# Patient Record
Sex: MALE | Race: AMERICAN INDIAN OR ALASKA NATIVE
[De-identification: names, ages, dates, MRNs, and addresses within clinical notes are randomized per-mention and may not be internally consistent; named-entity substitution may affect disease eponyms.]

---

## 2019-11-07 ENCOUNTER — HOSPITAL ENCOUNTER (INPATIENT)
Dept: HOSPITAL 5 - 4A | Age: 64
LOS: 4 days | Discharge: HOME HEALTH | DRG: 640 | End: 2019-11-11
Attending: INTERNAL MEDICINE | Admitting: INTERNAL MEDICINE
Payer: MEDICARE

## 2019-11-07 DIAGNOSIS — D63.1: ICD-10-CM

## 2019-11-07 DIAGNOSIS — Z80.1: ICD-10-CM

## 2019-11-07 DIAGNOSIS — Z82.49: ICD-10-CM

## 2019-11-07 DIAGNOSIS — C34.90: ICD-10-CM

## 2019-11-07 DIAGNOSIS — N17.0: ICD-10-CM

## 2019-11-07 DIAGNOSIS — E44.0: ICD-10-CM

## 2019-11-07 DIAGNOSIS — E87.1: Primary | ICD-10-CM

## 2019-11-07 DIAGNOSIS — N18.9: ICD-10-CM

## 2019-11-07 DIAGNOSIS — D63.8: ICD-10-CM

## 2019-11-07 DIAGNOSIS — N10: ICD-10-CM

## 2019-11-07 DIAGNOSIS — J44.9: ICD-10-CM

## 2019-11-07 LAB
ALBUMIN SERPL-MCNC: 3.1 G/DL (ref 3.9–5)
ALT SERPL-CCNC: 6 UNITS/L (ref 7–56)
BUN SERPL-MCNC: 54 MG/DL (ref 9–20)
BUN/CREAT SERPL: 12 %
CALCIUM SERPL-MCNC: 8.6 MG/DL (ref 8.4–10.2)
HCT VFR BLD CALC: 25.9 % (ref 35.5–45.6)
HEMOLYSIS INDEX: 0
HGB BLD-MCNC: 8.8 GM/DL (ref 11.8–15.2)
MCHC RBC AUTO-ENTMCNC: 34 % (ref 32–34)
MCV RBC AUTO: 96 FL (ref 84–94)
PLATELET # BLD: 195 K/MM3 (ref 140–440)
RBC # BLD AUTO: 2.69 M/MM3 (ref 3.65–5.03)

## 2019-11-07 PROCEDURE — 85007 BL SMEAR W/DIFF WBC COUNT: CPT

## 2019-11-07 PROCEDURE — 36415 COLL VENOUS BLD VENIPUNCTURE: CPT

## 2019-11-07 PROCEDURE — 90686 IIV4 VACC NO PRSV 0.5 ML IM: CPT

## 2019-11-07 PROCEDURE — 96374 THER/PROPH/DIAG INJ IV PUSH: CPT

## 2019-11-07 PROCEDURE — 89050 BODY FLUID CELL COUNT: CPT

## 2019-11-07 PROCEDURE — 85025 COMPLETE CBC W/AUTO DIFF WBC: CPT

## 2019-11-07 PROCEDURE — 87116 MYCOBACTERIA CULTURE: CPT

## 2019-11-07 PROCEDURE — 81001 URINALYSIS AUTO W/SCOPE: CPT

## 2019-11-07 PROCEDURE — 94760 N-INVAS EAR/PLS OXIMETRY 1: CPT

## 2019-11-07 PROCEDURE — 71045 X-RAY EXAM CHEST 1 VIEW: CPT

## 2019-11-07 PROCEDURE — 94640 AIRWAY INHALATION TREATMENT: CPT

## 2019-11-07 PROCEDURE — 84300 ASSAY OF URINE SODIUM: CPT

## 2019-11-07 PROCEDURE — 96375 TX/PRO/DX INJ NEW DRUG ADDON: CPT

## 2019-11-07 PROCEDURE — 85730 THROMBOPLASTIN TIME PARTIAL: CPT

## 2019-11-07 PROCEDURE — 82570 ASSAY OF URINE CREATININE: CPT

## 2019-11-07 PROCEDURE — 84156 ASSAY OF PROTEIN URINE: CPT

## 2019-11-07 PROCEDURE — 85027 COMPLETE CBC AUTOMATED: CPT

## 2019-11-07 PROCEDURE — 80053 COMPREHEN METABOLIC PANEL: CPT

## 2019-11-07 PROCEDURE — 85610 PROTHROMBIN TIME: CPT

## 2019-11-07 PROCEDURE — 80048 BASIC METABOLIC PNL TOTAL CA: CPT

## 2019-11-07 PROCEDURE — 77012 CT SCAN FOR NEEDLE BIOPSY: CPT

## 2019-11-07 PROCEDURE — 82962 GLUCOSE BLOOD TEST: CPT

## 2019-11-07 RX ADMIN — SODIUM CHLORIDE SCH MLS/HR: 0.9 INJECTION, SOLUTION INTRAVENOUS at 23:17

## 2019-11-07 RX ADMIN — Medication SCH ML: at 23:17

## 2019-11-07 RX ADMIN — OXYCODONE AND ACETAMINOPHEN PRN TAB: 5; 325 TABLET ORAL at 23:16

## 2019-11-07 RX ADMIN — FAMOTIDINE SCH MG: 20 TABLET ORAL at 23:15

## 2019-11-08 LAB
ALBUMIN SERPL-MCNC: 3 G/DL (ref 3.9–5)
ALT SERPL-CCNC: < 5 UNITS/L (ref 7–56)
ANISOCYTOSIS BLD QL SMEAR: (no result)
ANISOCYTOSIS BLD QL SMEAR: (no result)
APTT BLD: 33.2 SEC. (ref 24.2–36.6)
BAND NEUTROPHILS # (MANUAL): 0 K/MM3
BAND NEUTROPHILS # (MANUAL): 0 K/MM3
BILIRUB UR QL STRIP: (no result)
BLOOD UR QL VISUAL: (no result)
BUN SERPL-MCNC: 52 MG/DL (ref 9–20)
BUN/CREAT SERPL: 12 %
CALCIUM SERPL-MCNC: 8.5 MG/DL (ref 8.4–10.2)
HCT VFR BLD CALC: 24.4 % (ref 35.5–45.6)
HEMOLYSIS INDEX: 0
HGB BLD-MCNC: 8.2 GM/DL (ref 11.8–15.2)
INR PPP: 1.21 (ref 0.87–1.13)
MCHC RBC AUTO-ENTMCNC: 34 % (ref 32–34)
MCV RBC AUTO: 95 FL (ref 84–94)
MYELOCYTES # (MANUAL): 0 K/MM3
MYELOCYTES # (MANUAL): 0 K/MM3
PH UR STRIP: 6 [PH] (ref 5–7)
PLATELET # BLD: 192 K/MM3 (ref 140–440)
PROMYELOCYTES # (MANUAL): 0 K/MM3
PROMYELOCYTES # (MANUAL): 0 K/MM3
PROT UR STRIP-MCNC: (no result) MG/DL
RBC # BLD AUTO: 2.58 M/MM3 (ref 3.65–5.03)
RBC #/AREA URNS HPF: 2 /HPF (ref 0–6)
TOTAL CELLS COUNTED BLD: 100
TOTAL CELLS COUNTED BLD: 100
UROBILINOGEN UR-MCNC: < 2 MG/DL (ref ?–2)
WBC #/AREA URNS HPF: < 1 /HPF (ref 0–6)

## 2019-11-08 PROCEDURE — 0TB03ZX EXCISION OF RIGHT KIDNEY, PERCUTANEOUS APPROACH, DIAGNOSTIC: ICD-10-PCS

## 2019-11-08 RX ADMIN — Medication SCH ML: at 11:58

## 2019-11-08 RX ADMIN — HYDROMORPHONE HYDROCHLORIDE PRN MG: 1 INJECTION, SOLUTION INTRAMUSCULAR; INTRAVENOUS; SUBCUTANEOUS at 20:33

## 2019-11-08 RX ADMIN — Medication SCH ML: at 23:29

## 2019-11-08 RX ADMIN — SODIUM CHLORIDE SCH MLS/HR: 0.9 INJECTION, SOLUTION INTRAVENOUS at 15:29

## 2019-11-08 RX ADMIN — SODIUM CHLORIDE SCH MLS/HR: 0.9 INJECTION, SOLUTION INTRAVENOUS at 23:20

## 2019-11-08 RX ADMIN — METHYLPREDNISOLONE SODIUM SUCCINATE SCH MG: 125 INJECTION, POWDER, FOR SOLUTION INTRAMUSCULAR; INTRAVENOUS at 11:50

## 2019-11-08 RX ADMIN — OXYCODONE AND ACETAMINOPHEN PRN TAB: 5; 325 TABLET ORAL at 23:25

## 2019-11-08 RX ADMIN — HYDROMORPHONE HYDROCHLORIDE PRN MG: 1 INJECTION, SOLUTION INTRAMUSCULAR; INTRAVENOUS; SUBCUTANEOUS at 08:24

## 2019-11-08 RX ADMIN — ONDANSETRON PRN MG: 2 INJECTION INTRAMUSCULAR; INTRAVENOUS at 20:33

## 2019-11-08 RX ADMIN — IPRATROPIUM BROMIDE AND ALBUTEROL SULFATE SCH AMPUL: .5; 3 SOLUTION RESPIRATORY (INHALATION) at 13:40

## 2019-11-08 RX ADMIN — FAMOTIDINE SCH MG: 20 TABLET ORAL at 11:50

## 2019-11-08 RX ADMIN — IPRATROPIUM BROMIDE AND ALBUTEROL SULFATE SCH AMPUL: .5; 3 SOLUTION RESPIRATORY (INHALATION) at 21:29

## 2019-11-08 RX ADMIN — FAMOTIDINE SCH MG: 20 TABLET ORAL at 23:20

## 2019-11-08 RX ADMIN — SENNOSIDES AND DOCUSATE SODIUM PRN TAB: 50; 8.6 TABLET ORAL at 23:21

## 2019-11-08 NOTE — CAT SCAN REPORT
CT-GUIDED RENAL BIOPSY, RIGHT



INDICATION : acute renal failure.



COMPARISON:  None



PROCEDURE:  The risks (including but not limited to bleeding and infection) and benefits were explain
ed to the patient and informed consent was obtained.  All CT scans at this location are performed usi
ng CT dose reduction for ALARA by means of automated exposure control. 



A time out procedure was performed.  The procedure site was prepped and draped in the usual sterile f
ashion and lidocaine was used for local anesthesia.  



Anxiolysis was accomplished with IV Dilaudid and Zofran. Using CT guidance, a 17-gauge introducer nee
dle was advanced to the inferior pole of the right kidney. 2 separate 18-gauge 1.3 cm core biopsies w
ere obtained for pathologic analysis. Follow-up scan demonstrates no evidence for uncontained hemorrh
age.



The patient tolerated the procedure well with no complications.



IMPRESSION: Successful CT-guided biopsy at the inferior pole of the right kidney.



Signer Name: Donny Jaffe Jr, MD 

Signed: 11/8/2019 11:43 AM

 Workstation Name: [id number]

## 2019-11-08 NOTE — HISTORY AND PHYSICAL REPORT
CHIEF COMPLAINT:

1.  Elevated creatinine level.

2.  Direct admission from Dr. Reis's office.



HISTORY OF PRESENT ILLNESS:  A 64-year-old male with history of lung cancer and

elevated creatinine, recently admitted to [facility] from

10/14/2019 to 10/25/2019 for acute kidney injury and worsening pulmonary

infiltration from his lung cancer, on chemotherapy, comes in for worsening

creatinine level.  The patient's creatinine was 1.5 in 2019.  Recent

creatinine at [facility] was high with BUN of 72 and creatinine of 4.7 and

GFR of 14 mL per minute.  The patient follows at [facility] for his lung cancer

and chemotherapy.  No history of diabetes, hypertension.  The patient sent from

Dr. Reis's office for worsening creatinine level for acute kidney injury on

chronic kidney disease.



PAST MEDICAL HISTORY:  As mentioned, lung cancer, chronic kidney disease and

COPD secondary to smoking.  Arthritis.



PAST SURGICAL HISTORY:  Denies.



FAMILY HISTORY:  Father  secondary to lung cancer.  Mother is alive.



SOCIAL HISTORY:  Smoker.  Stopped smoking recently.  No alcohol, no recreational

drugs.



FAMILY HISTORY:  Hypertension and lung cancer.



REVIEW OF SYSTEMS:  Significant for generalized weakness.  Able to pass urine. 

No shortness of breath.  Otherwise, review of systems negative.  No fever or

chills.  A 14-point review of systems done.



PHYSICAL EXAMINATION:

GENERAL:  A young elderly male, cooperative during examination.

VITAL SIGNS:  Blood pressure 112/79, temperature 98.8, respiratory rate is 20,

sats are 100%.

HEENT:  Unremarkable.  Pupils equal and reactive.

NECK:  Supple, no lymphadenopathy, no thyromegaly.

LUNGS:  Clear to auscultation and percussion.  Good air entry.

CARDIOVASCULAR:  S1, S2 heard.  No gallop, no murmur, no rub.  Apical impulse in

left fifth intercostal space and midclavicular line.

ABDOMEN:  Soft and benign.  No hepatosplenomegaly.  No guarding, no rigidity. 

Hernial orifices are normal.

EXTREMITIES:  Good pedal pulses.  No pedal edema.

CENTRAL NERVOUS SYSTEM:  Alert and oriented x 4, nonfocal exam.



LABORATORY DATA:  Significant for white count of 7500, H and H of 8.8 and 25.9,

platelet count of 195,000.  Sodium is 129, BUN and creatinine is 54 and 4.5,

potassium is 4.6, albumin is 3.1.



ASSESSMENT AND PLAN:

1.  Acute kidney injury, IV fluids for now.  The patient to have renal biopsy

tomorrow.  Nephrology consult requested.

2.Lung cancer.  The patient following with [facility].

3.Chronic kidney disease as per Dr. Reis.

4.COPD--Cont Duonebs qid prn

5.Deep venous thrombosis prophylaxis, heparin 5000 q. 12.



In summary, patient has MIGUELANGEL on chronic kidney disease and lung cancer for which

a follow up visit at [facility].  The patient for renal biopsy in the morning.





DD: 2019 

DT: 2019 

JOB# [id number]  [id number]

[facility]/PRUDENCE MORALES

## 2019-11-08 NOTE — PROGRESS NOTE
Assessment and Plan


Assessment and plan: 


--Acute kidney injury; probably secondary to vasomotor nephropathy


Gentle hydration, monitor renal function, avoid nephrotoxins


Nephrology following, recommended biopsy





--Hyponatremia; gentle hydration, monitor electrolytes





--Anemia; probably anemia due to ?  Chronic kidney disease


Closely monitor H&H and transfuse as needed





--History of lung cancer; follows with hematology oncologist





-History of COPD; oxygen titrate to O2 sats more than 90%


Nebulizers IV steroids if needed





--Moderate malnutrition/hypoalbuminemia


Supportive care nutrition consult if needed





--DVT prophylaxis; heparin renal dose





Monitor closely and adjust management as needed





History


Interval history: 


Sincerely and examined medical records reviewed and 


patient feels better no new complaints


Vital signs noted











Hospitalist Physical





- Constitutional


Vitals: 


                                        











Temp Pulse Resp BP Pulse Ox


 


 97.9 F   84   18   112/73   98 


 


 11/08/19 07:35  11/08/19 07:35  11/08/19 07:35  11/08/19 07:35  11/08/19 08:11











General appearance: Present: no acute distress, well-nourished





- EENT


Eyes: Present: PERRL, EOM intact





- Neck


Neck: Present: supple, normal ROM





- Respiratory


Respiratory effort: normal


Respiratory: bilateral: diminished, negative: rales, rhonchi, wheezing





- Cardiovascular


Rhythm: regular


Heart Sounds: Present: S1 & S2





- Extremities


Extremities: no ischemia, No edema





- Abdominal


General gastrointestinal: soft, non-tender, non-distended, normal bowel sounds





- Integumentary


Integumentary: Present: clear, warm





- Psychiatric


Psychiatric: appropriate mood/affect, cooperative





- Neurologic


Neurologic: moves all extremities





Results





- Labs


CBC & Chem 7: 


                                 11/09/19 05:51





                                 11/09/19 05:51


Labs: 


                             Laboratory Last Values











WBC  6.6 K/mm3 (4.5-11.0)   11/08/19  05:41    


 


RBC  2.58 M/mm3 (3.65-5.03)  L  11/08/19  05:41    


 


Hgb  8.2 gm/dl (11.8-15.2)  L  11/08/19  05:41    


 


Hct  24.4 % (35.5-45.6)  L  11/08/19  05:41    


 


MCV  95 fl (84-94)  H  11/08/19  05:41    


 


MCH  32 pg (28-32)   11/08/19  05:41    


 


MCHC  34 % (32-34)   11/08/19  05:41    


 


RDW  24.7 % (13.2-15.2)  H  11/08/19  05:41    


 


Plt Count  192 K/mm3 (140-440)   11/08/19  05:41    


 


Add Manual Diff  Complete   11/08/19  05:41    


 


Total Counted  100   11/08/19  05:41    


 


Seg Neuts % (Manual)  79.0 % (40.0-70.0)  H  11/08/19  05:41    


 


Band Neutrophils %  0 %  11/08/19  05:41    


 


Lymphocytes % (Manual)  12.0 % (13.4-35.0)  L  11/08/19  05:41    


 


Reactive Lymphs % (Man)  0 %  11/08/19  05:41    


 


Monocytes % (Manual)  8.0 % (0.0-7.3)  H  11/08/19  05:41    


 


Eosinophils % (Manual)  1.0 % (0.0-4.3)   11/08/19  05:41    


 


Basophils % (Manual)  0 % (0.0-1.8)   11/08/19  05:41    


 


Metamyelocytes %  0 %  11/08/19  05:41    


 


Myelocytes %  0 %  11/08/19  05:41    


 


Promyelocytes %  0 %  11/08/19  05:41    


 


Blast Cells %  0 %  11/08/19  05:41    


 


Nucleated RBC %  Not Reportable   11/08/19  05:41    


 


Seg Neutrophils # Man  5.2 K/mm3 (1.8-7.7)   11/08/19  05:41    


 


Band Neutrophils #  0.0 K/mm3  11/08/19  05:41    


 


Lymphocytes # (Manual)  0.8 K/mm3 (1.2-5.4)  L  11/08/19  05:41    


 


Abs React Lymphs (Man)  0.0 K/mm3  11/08/19  05:41    


 


Monocytes # (Manual)  0.5 K/mm3 (0.0-0.8)   11/08/19  05:41    


 


Eosinophils # (Manual)  0.1 K/mm3 (0.0-0.4)   11/08/19  05:41    


 


Basophils # (Manual)  0.0 K/mm3 (0.0-0.1)   11/08/19  05:41    


 


Metamyelocytes #  0.0 K/mm3  11/08/19  05:41    


 


Myelocytes #  0.0 K/mm3  11/08/19  05:41    


 


Promyelocytes #  0.0 K/mm3  11/08/19  05:41    


 


Blast Cells #  0.0 K/mm3  11/08/19  05:41    


 


WBC Morphology  Not Reportable   11/08/19  05:41    


 


Hypersegmented Neuts  Not Reportable   11/08/19  05:41    


 


Hyposegmented Neuts  Not Reportable   11/08/19  05:41    


 


Hypogranular Neuts  Not Reportable   11/08/19  05:41    


 


Smudge Cells  Not Reportable   11/08/19  05:41    


 


Toxic Granulation  Not Reportable   11/08/19  05:41    


 


Toxic Vacuolation  Not Reportable   11/08/19  05:41    


 


Dohle Bodies  Not Reportable   11/08/19  05:41    


 


Pelger-Huet Anomaly  Not Reportable   11/08/19  05:41    


 


Zack Rods  Not Reportable   11/08/19  05:41    


 


Platelet Estimate  Consistent w auto   11/08/19  05:41    


 


Clumped Platelets  Not Reportable   11/08/19  05:41    


 


Plt Clumps, EDTA  Not Reportable   11/08/19  05:41    


 


Large Platelets  Not Reportable   11/08/19  05:41    


 


Giant Platelets  Not Reportable   11/08/19  05:41    


 


Platelet Satelliting  Not Reportable   11/08/19  05:41    


 


Plt Morphology Comment  Not Reportable   11/08/19  05:41    


 


RBC Morphology  Not Reportable   11/08/19  05:41    


 


Dimorphic RBCs  Not Reportable   11/08/19  05:41    


 


Polychromasia  Not Reportable   11/08/19  05:41    


 


Hypochromasia  Not Reportable   11/08/19  05:41    


 


Poikilocytosis  Few   11/08/19  05:41    


 


Anisocytosis  1+   11/08/19  05:41    


 


Microcytosis  Few   11/08/19  05:41    


 


Macrocytosis  Not Reportable   11/08/19  05:41    


 


Spherocytes  Not Reportable   11/08/19  05:41    


 


Pappenheimer Bodies  Not Reportable   11/08/19  05:41    


 


Sickle Cells  Not Reportable   11/08/19  05:41    


 


Target Cells  Not Reportable   11/08/19  05:41    


 


Tear Drop Cells  Rare   11/08/19  05:41    


 


Ovalocytes  Few   11/08/19  05:41    


 


Helmet Cells  Not Reportable   11/08/19  05:41    


 


Wilson-Delia Bodies  Not Reportable   11/08/19  05:41    


 


Cabot Rings  Not Reportable   11/08/19  05:41    


 


[place] Cells  Not Reportable   11/08/19  05:41    


 


Bite Cells  Not Reportable   11/08/19  05:41    


 


Crenated Cell  Not Reportable   11/08/19  05:41    


 


Elliptocytes  Few   11/08/19  05:41    


 


Acanthocytes (Spur)  Not Reportable   11/08/19  05:41    


 


Rouleaux  Not Reportable   11/08/19  05:41    


 


Hemoglobin C Crystals  Not Reportable   11/08/19  05:41    


 


Schistocytes  Not Reportable   11/08/19  05:41    


 


Malaria parasites  Not Reportable   11/08/19  05:41    


 


Sharad Bodies  Not Reportable   11/08/19  05:41    


 


Hem Pathologist Commnt  No   11/08/19  05:41    


 


PT  15.2 Sec. (12.2-14.9)  H  11/08/19  05:41    


 


INR  1.21  (0.87-1.13)  H  11/08/19  05:41    


 


APTT  33.2 Sec. (24.2-36.6)   11/08/19  05:41    


 


Sodium  131 mmol/L (137-145)  L  11/08/19  05:41    


 


Potassium  4.1 mmol/L (3.6-5.0)   11/08/19  05:41    


 


Chloride  98.1 mmol/L ()   11/08/19  05:41    


 


Carbon Dioxide  18 mmol/L (22-30)  L  11/08/19  05:41    


 


Anion Gap  19 mmol/L  11/08/19  05:41    


 


BUN  52 mg/dL (9-20)  H  11/08/19  05:41    


 


Creatinine  4.2 mg/dL (0.8-1.5)  H  11/08/19  05:41    


 


Estimated GFR  17 ml/min  11/08/19  05:41    


 


BUN/Creatinine Ratio  12 %  11/08/19  05:41    


 


Glucose  91 mg/dL ()   11/08/19  05:41    


 


Calcium  8.5 mg/dL (8.4-10.2)   11/08/19  05:41    


 


Total Bilirubin  0.40 mg/dL (0.1-1.2)   11/08/19  05:41    


 


AST  19 units/L (5-40)   11/08/19  05:41    


 


ALT  < 5 units/L (7-56)  L  11/08/19  05:41    


 


Alkaline Phosphatase  68 units/L ()   11/08/19  05:41    


 


Total Protein  6.7 g/dL (6.3-8.2)   11/08/19  05:41    


 


Albumin  3.0 g/dL (3.9-5)  L  11/08/19  05:41    


 


Albumin/Globulin Ratio  0.8 %  11/08/19  05:41    














Active Medications





- Current Medications


Current Medications: 














Generic Name Dose Route Start Last Admin





  Trade Name Freq  PRN Reason Stop Dose Admin


 


Acetaminophen  650 mg  11/07/19 22:20 





  Tylenol  PO  





  Q4H PRN  





  Pain MILD(1-3)/Fever >100.5/HA  


 


Famotidine  20 mg  11/07/19 23:00  11/07/19 23:15





  Pepcid  PO   20 mg





  BID FERNANDA   Administration


 


Hydromorphone HCl  0.5 mg  11/07/19 22:20  11/08/19 08:24





  Dilaudid  IV   0.5 mg





  Q3H PRN   Administration





  Pain , Severe (7-10)  


 


Sodium Chloride  1,000 mls @ 100 mls/hr  11/07/19 19:00  11/07/19 23:17





  Nacl 0.9% 1000 Ml  IV   100 mls/hr





  AS DIRECT FERNANDA   Administration


 


Ondansetron HCl  4 mg  11/07/19 22:20 





  Zofran  IV  





  Q8H PRN  





  Nausea And Vomiting  


 


Oxycodone/Acetaminophen  1 tab  11/07/19 22:20  11/07/19 23:16





  Percocet 5/325  PO   1 tab





  Q6H PRN   Administration





  Pain, Moderate (4-6)  


 


Sodium Chloride  10 ml  11/07/19 23:00  11/07/19 23:17





  Sodium Chloride Flush Syringe 10 Ml  IV   10 ml





  BID FERNANDA   Administration


 


Sodium Chloride  10 ml  11/07/19 22:20 





  Sodium Chloride Flush Syringe 10 Ml  IV  





  PRN PRN  





  LINE FLUSH

## 2019-11-09 LAB
BUN SERPL-MCNC: 53 MG/DL (ref 9–20)
BUN/CREAT SERPL: 12 %
CALCIUM SERPL-MCNC: 8.8 MG/DL (ref 8.4–10.2)
HCT VFR BLD CALC: 22.2 % (ref 35.5–45.6)
HEMOLYSIS INDEX: 2
HGB BLD-MCNC: 7.4 GM/DL (ref 11.8–15.2)
MCHC RBC AUTO-ENTMCNC: 34 % (ref 32–34)
MCV RBC AUTO: 96 FL (ref 84–94)
PLATELET # BLD: 222 K/MM3 (ref 140–440)
RBC # BLD AUTO: 2.3 M/MM3 (ref 3.65–5.03)

## 2019-11-09 RX ADMIN — IPRATROPIUM BROMIDE AND ALBUTEROL SULFATE SCH AMPUL: .5; 3 SOLUTION RESPIRATORY (INHALATION) at 22:05

## 2019-11-09 RX ADMIN — Medication SCH ML: at 21:38

## 2019-11-09 RX ADMIN — SODIUM CHLORIDE SCH MLS/HR: 0.9 INJECTION, SOLUTION INTRAVENOUS at 19:45

## 2019-11-09 RX ADMIN — Medication SCH ML: at 10:33

## 2019-11-09 RX ADMIN — ONDANSETRON PRN MG: 2 INJECTION INTRAMUSCULAR; INTRAVENOUS at 19:45

## 2019-11-09 RX ADMIN — METHYLPREDNISOLONE SODIUM SUCCINATE SCH MG: 125 INJECTION, POWDER, FOR SOLUTION INTRAMUSCULAR; INTRAVENOUS at 10:30

## 2019-11-09 RX ADMIN — FAMOTIDINE SCH MG: 20 TABLET ORAL at 10:30

## 2019-11-09 RX ADMIN — IPRATROPIUM BROMIDE AND ALBUTEROL SULFATE SCH AMPUL: .5; 3 SOLUTION RESPIRATORY (INHALATION) at 07:46

## 2019-11-09 RX ADMIN — HYDROMORPHONE HYDROCHLORIDE PRN MG: 1 INJECTION, SOLUTION INTRAMUSCULAR; INTRAVENOUS; SUBCUTANEOUS at 10:36

## 2019-11-09 RX ADMIN — SENNOSIDES AND DOCUSATE SODIUM PRN TAB: 50; 8.6 TABLET ORAL at 20:35

## 2019-11-09 RX ADMIN — IPRATROPIUM BROMIDE AND ALBUTEROL SULFATE SCH AMPUL: .5; 3 SOLUTION RESPIRATORY (INHALATION) at 14:07

## 2019-11-09 RX ADMIN — FAMOTIDINE SCH MG: 20 TABLET ORAL at 21:37

## 2019-11-09 RX ADMIN — HYDROMORPHONE HYDROCHLORIDE PRN MG: 1 INJECTION, SOLUTION INTRAMUSCULAR; INTRAVENOUS; SUBCUTANEOUS at 20:37

## 2019-11-09 NOTE — PROGRESS NOTE
Assessment and Plan


Assessment and plan: 


--Acute kidney injury; probably secondary to vasomotor nephropathy


Gentle hydration, monitor renal function, avoid nephrotoxins


Nephrology following, recommended biopsy





--Hyponatremia; improving gradually


 gentle hydration with sodium chloride, monitor electrolytes





--Anemia; probably anemia due to ?  Chronic kidney disease


Closely monitor H&H and transfuse as needed





--History of lung cancer; follows with hematology oncologist





-History of COPD; oxygen titrate to O2 sats more than 90%


Nebulizers IV steroids if needed





--Moderate malnutrition/hypoalbuminemia


Supportive care nutrition consult if needed





--DVT prophylaxis; heparin renal dose





Monitor closely and adjust management as needed








History


Interval history: 


Patient seen and examined medical records reviewed


Patient is very concerned about his kidneys


Complains of generalized weakness


Vital signs noted








Hospitalist Physical





- Constitutional


Vitals: 


                                        











Temp Pulse Resp BP Pulse Ox


 


 98.0 F   89   18   117/73   98 


 


 11/09/19 04:38  11/09/19 04:38  11/09/19 04:38  11/09/19 04:38  11/09/19 04:38











General appearance: Present: no acute distress, well-nourished





- EENT


Eyes: Present: PERRL, EOM intact





- Neck


Neck: Present: supple, normal ROM





- Respiratory


Respiratory effort: normal


Respiratory: bilateral: diminished, negative: rales, rhonchi, wheezing





- Cardiovascular


Rhythm: regular


Heart Sounds: Present: S1 & S2





- Extremities


Extremities: no ischemia, No edema





- Abdominal


General gastrointestinal: soft, non-tender, non-distended, normal bowel sounds





- Integumentary


Integumentary: Present: clear, warm





- Psychiatric


Psychiatric: appropriate mood/affect, cooperative





- Neurologic


Neurologic: moves all extremities





Results





- Labs


CBC & Chem 7: 


                                 11/09/19 05:51





                                 11/09/19 05:51


Labs: 


                             Laboratory Last Values











WBC  9.8 K/mm3 (4.5-11.0)   11/09/19  05:51    


 


RBC  2.30 M/mm3 (3.65-5.03)  L  11/09/19  05:51    


 


Hgb  7.4 gm/dl (11.8-15.2)  L  11/09/19  05:51    


 


Hct  22.2 % (35.5-45.6)  L  11/09/19  05:51    


 


MCV  96 fl (84-94)  H  11/09/19  05:51    


 


MCH  32 pg (28-32)   11/09/19  05:51    


 


MCHC  34 % (32-34)   11/09/19  05:51    


 


RDW  24.0 % (13.2-15.2)  H  11/09/19  05:51    


 


Plt Count  222 K/mm3 (140-440)   11/09/19  05:51    


 


Add Manual Diff  Complete   11/08/19  05:41    


 


Total Counted  100   11/08/19  05:41    


 


Seg Neuts % (Manual)  79.0 % (40.0-70.0)  H  11/08/19  05:41    


 


Band Neutrophils %  0 %  11/08/19  05:41    


 


Lymphocytes % (Manual)  12.0 % (13.4-35.0)  L  11/08/19  05:41    


 


Reactive Lymphs % (Man)  0 %  11/08/19  05:41    


 


Monocytes % (Manual)  8.0 % (0.0-7.3)  H  11/08/19  05:41    


 


Eosinophils % (Manual)  1.0 % (0.0-4.3)   11/08/19  05:41    


 


Basophils % (Manual)  0 % (0.0-1.8)   11/08/19  05:41    


 


Metamyelocytes %  0 %  11/08/19  05:41    


 


Myelocytes %  0 %  11/08/19  05:41    


 


Promyelocytes %  0 %  11/08/19  05:41    


 


Blast Cells %  0 %  11/08/19  05:41    


 


Nucleated RBC %  Not Reportable   11/08/19  05:41    


 


Seg Neutrophils # Man  5.2 K/mm3 (1.8-7.7)   11/08/19  05:41    


 


Band Neutrophils #  0.0 K/mm3  11/08/19  05:41    


 


Lymphocytes # (Manual)  0.8 K/mm3 (1.2-5.4)  L  11/08/19  05:41    


 


Abs React Lymphs (Man)  0.0 K/mm3  11/08/19  05:41    


 


Monocytes # (Manual)  0.5 K/mm3 (0.0-0.8)   11/08/19  05:41    


 


Eosinophils # (Manual)  0.1 K/mm3 (0.0-0.4)   11/08/19  05:41    


 


Basophils # (Manual)  0.0 K/mm3 (0.0-0.1)   11/08/19  05:41    


 


Metamyelocytes #  0.0 K/mm3  11/08/19  05:41    


 


Myelocytes #  0.0 K/mm3  11/08/19  05:41    


 


Promyelocytes #  0.0 K/mm3  11/08/19  05:41    


 


Blast Cells #  0.0 K/mm3  11/08/19  05:41    


 


WBC Morphology  Not Reportable   11/08/19  05:41    


 


Hypersegmented Neuts  Not Reportable   11/08/19  05:41    


 


Hyposegmented Neuts  Not Reportable   11/08/19  05:41    


 


Hypogranular Neuts  Not Reportable   11/08/19  05:41    


 


Smudge Cells  Not Reportable   11/08/19  05:41    


 


Toxic Granulation  Not Reportable   11/08/19  05:41    


 


Toxic Vacuolation  Not Reportable   11/08/19  05:41    


 


Dohle Bodies  Not Reportable   11/08/19  05:41    


 


Pelger-Huet Anomaly  Not Reportable   11/08/19  05:41    


 


Zack Rods  Not Reportable   11/08/19  05:41    


 


Platelet Estimate  Consistent w auto   11/08/19  05:41    


 


Clumped Platelets  Not Reportable   11/08/19  05:41    


 


Plt Clumps, EDTA  Not Reportable   11/08/19  05:41    


 


Large Platelets  Not Reportable   11/08/19  05:41    


 


Giant Platelets  Not Reportable   11/08/19  05:41    


 


Platelet Satelliting  Not Reportable   11/08/19  05:41    


 


Plt Morphology Comment  Not Reportable   11/08/19  05:41    


 


RBC Morphology  Not Reportable   11/08/19  05:41    


 


Dimorphic RBCs  Not Reportable   11/08/19  05:41    


 


Polychromasia  Not Reportable   11/08/19  05:41    


 


Hypochromasia  Not Reportable   11/08/19  05:41    


 


Poikilocytosis  Few   11/08/19  05:41    


 


Anisocytosis  1+   11/08/19  05:41    


 


Microcytosis  Few   11/08/19  05:41    


 


Macrocytosis  Not Reportable   11/08/19  05:41    


 


Spherocytes  Not Reportable   11/08/19  05:41    


 


Pappenheimer Bodies  Not Reportable   11/08/19  05:41    


 


Sickle Cells  Not Reportable   11/08/19  05:41    


 


Target Cells  Not Reportable   11/08/19  05:41    


 


Tear Drop Cells  Rare   11/08/19  05:41    


 


Ovalocytes  Few   11/08/19  05:41    


 


Helmet Cells  Not Reportable   11/08/19  05:41    


 


[place] Bodies  Not Reportable   11/08/19  05:41    


 


Cabot Rings  Not Reportable   11/08/19  05:41    


 


Karuna Cells  Not Reportable   11/08/19  05:41    


 


Bite Cells  Not Reportable   11/08/19  05:41    


 


Crenated Cell  Not Reportable   11/08/19  05:41    


 


Elliptocytes  Few   11/08/19  05:41    


 


Acanthocytes (Spur)  Not Reportable   11/08/19  05:41    


 


Rouleaux  Not Reportable   11/08/19  05:41    


 


Hemoglobin C Crystals  Not Reportable   11/08/19  05:41    


 


Schistocytes  Not Reportable   11/08/19  05:41    


 


Malaria parasites  Not Reportable   11/08/19  05:41    


 


Sharad Bodies  Not Reportable   11/08/19  05:41    


 


Hem Pathologist Commnt  No   11/08/19  05:41    


 


PT  15.2 Sec. (12.2-14.9)  H  11/08/19  05:41    


 


INR  1.21  (0.87-1.13)  H  11/08/19  05:41    


 


APTT  33.2 Sec. (24.2-36.6)   11/08/19  05:41    


 


Sodium  130 mmol/L (137-145)  L  11/09/19  05:51    


 


Potassium  4.6 mmol/L (3.6-5.0)   11/09/19  05:51    


 


Chloride  96.8 mmol/L ()  L  11/09/19  05:51    


 


Carbon Dioxide  17 mmol/L (22-30)  L  11/09/19  05:51    


 


Anion Gap  21 mmol/L  11/09/19  05:51    


 


BUN  53 mg/dL (9-20)  H  11/09/19  05:51    


 


Creatinine  4.6 mg/dL (0.8-1.5)  H  11/09/19  05:51    


 


Estimated GFR  16 ml/min  11/09/19  05:51    


 


BUN/Creatinine Ratio  12 %  11/09/19  05:51    


 


Glucose  127 mg/dL ()  H  11/09/19  05:51    


 


POC Glucose  110  ()  H  11/08/19  12:08    


 


Calcium  8.8 mg/dL (8.4-10.2)   11/09/19  05:51    


 


Total Bilirubin  0.40 mg/dL (0.1-1.2)   11/08/19  05:41    


 


AST  19 units/L (5-40)   11/08/19  05:41    


 


ALT  < 5 units/L (7-56)  L  11/08/19  05:41    


 


Alkaline Phosphatase  68 units/L ()   11/08/19  05:41    


 


Total Protein  6.7 g/dL (6.3-8.2)   11/08/19  05:41    


 


Albumin  3.0 g/dL (3.9-5)  L  11/08/19  05:41    


 


Albumin/Globulin Ratio  0.8 %  11/08/19  05:41    


 


Urine Color  Straw  (Yellow)   11/07/19  Unknown


 


Urine Turbidity  Clear  (Clear)   11/07/19  Unknown


 


Urine pH  6.0  (5.0-7.0)   11/07/19  Unknown


 


Ur Specific Gravity  1.006  (1.003-1.030)   11/07/19  Unknown


 


Urine Protein  <15 mg/dl mg/dL (Negative)   11/07/19  Unknown


 


Urine Glucose (UA)  Neg mg/dL (Negative)   11/07/19  Unknown


 


Urine Ketones  Neg mg/dL (Negative)   11/07/19  Unknown


 


Urine Blood  Neg  (Negative)   11/07/19  Unknown


 


Urine Nitrite  Neg  (Negative)   11/07/19  Unknown


 


Urine Bilirubin  Neg  (Negative)   11/07/19  Unknown


 


Urine Urobilinogen  < 2.0 mg/dL (<2.0)   11/07/19  Unknown


 


Ur Leukocyte Esterase  Neg  (Negative)   11/07/19  Unknown


 


Urine WBC (Auto)  < 1.0 /HPF (0.0-6.0)   11/07/19  Unknown


 


Urine RBC (Auto)  2.0 /HPF (0.0-6.0)   11/07/19  Unknown


 


Urine Eosinophils  None seen  (None Seen)   11/07/19  Unknown














Active Medications





- Current Medications


Current Medications: 














Generic Name Dose Route Start Last Admin





  Trade Name Freq  PRN Reason Stop Dose Admin


 


Acetaminophen  650 mg  11/07/19 22:20 





  Tylenol  PO  





  Q4H PRN  





  Pain MILD(1-3)/Fever >100.5/HA  


 


Albuterol/Ipratropium  1 ampul  11/08/19 14:00  11/09/19 07:46





  Duoneb *Not For Prn Use*  IH   1 ampul





  TIDRT FERNANDA   Administration


 


Famotidine  20 mg  11/07/19 23:00  11/09/19 10:30





  Pepcid  PO   20 mg





  BID FERNANDA   Administration


 


Hydromorphone HCl  0.5 mg  11/07/19 22:20  11/09/19 10:36





  Dilaudid  IV   0.5 mg





  Q3H PRN   Administration





  Pain , Severe (7-10)  


 


Sodium Chloride  1,000 mls @ 100 mls/hr  11/07/19 19:00  11/08/19 23:20





  Nacl 0.9% 1000 Ml  IV   100 mls/hr





  AS DIRECT FERNANDA   Administration


 


Methylprednisolone Sodium Succinate  250 mg  11/08/19 10:00  11/09/19 10:30





  Solu-Medrol  IV  11/10/19 10:01  250 mg





  DAILY FERNANDA   Administration


 


Ondansetron HCl  4 mg  11/07/19 22:20  11/08/19 20:33





  Zofran  IV   4 mg





  Q8H PRN   Administration





  Nausea And Vomiting  


 


Oxycodone/Acetaminophen  1 tab  11/07/19 22:20  11/08/19 23:25





  Percocet 5/325  PO   1 tab





  Q6H PRN   Administration





  Pain, Moderate (4-6)  


 


Senna/Docusate Sodium  2 tab  11/08/19 21:07  11/08/19 23:21





  Senokot S  PO   2 tab





  Q12H PRN   Administration





  Laxative Effect  


 


Sodium Chloride  10 ml  11/07/19 23:00  11/09/19 10:33





  Sodium Chloride Flush Syringe 10 Ml  IV   10 ml





  BID FERNANDA   Administration


 


Sodium Chloride  10 ml  11/07/19 22:20 





  Sodium Chloride Flush Syringe 10 Ml  IV  





  PRN PRN  





  LINE FLUSH  














Nutrition/Malnutrition Assess





- Dietary Evaluation


Nutrition/Malnutrition Findings: 


                                        





Nutrition Notes                                            Start:  11/08/19 

11:39


Freq:                                                      Status: Active       




Protocol:                                                                       




 Document     11/08/19 11:39  DW  (Rec: 11/08/19 12:07  DW  [id number])


 Co-Sign      11/08/19 11:39  LP


 Nutrition Notes


     Need for Assessment generated from:         MD Order,RN Referral,MST


     Initial or Follow up                        Assessment


     Current Diagnosis                           CKD(stage I-IV),COPD


     Other Pertinent Diagnosis                   Lung Cancer on chemo


     Current Diet                                NPO


     Labs/Tests                                  Na 131


                                                 BUN 52


                                                 Cr 4.2


     Pertinent Medications                       Solu Medrol


     Height                                      5 ft 9 in


     Weight                                      75 kg


     Ideal Body Weight (kg)                      72.72


     BMI                                         24.4


     Weight change and time frame                19% wt loss in 2 weeks


     Subjective/Other Information                MD consult for poor oral


                                                 intake, ONS and MST screen


                                                 Upon arrival pt stated his


                                                 appetite was not good PTA and


                                                 did not feel like eating. Pt


                                                 stated his taste has changed


                                                 and enhances food with spices.


                                                 Pt stated he is feeling


                                                 better now with medication and


                                                 fluids given at hospital and


                                                 feels hungry now. Pt stated


                                                 his UBW is 164 two weeks ago.


                                                 Noted slight temporal wasting.


     Burn                                        Absent


     Trauma                                      Absent


     GI Symptoms                                 None


     Minimum of two criteria                     Yes


     Energy Intake (severe)                      < or equal to 50% Estimated


                                                 Energy Requirement > or equal


                                                 to 5 days


     Interpretation of Weight Loss (severe)      >2% in 1 week


     Muscle Mass                                 Mild Depletion (non-severe)


     #1


      Nutrition Diagnosis                        Malnutrition


      Etiology                                   chemo therapy and taste


                                                 changes secondary to lung


                                                 cancer


      As Evidenced by Signs and Symptoms         19% wt loss in 2 weeks, poor


                                                 appetite and intake >5 days,


                                                 mild temporal wasting


     Is patient on ventilator?                   No


     Is Patient Ambulatory and/or Out of Bed     Yes


     REE-([facility]-ambulatory/OOB) [     2002.494


      [email or web address].[email or web address]]                               


     Kcal/Kg value to use for calculation        34


     Approximate Energy Requirements Using       2550


      kcal/Kg                                    


     Calculation Used for Recommendations        Kcal/kg


     Additional Notes                            PRO needs: 60-90g (0.8-1.2g/kg


                                                 ) considering CKD,


                                                 malnutrition, cancer


                                                 Fluid needs: Per MD


 Nutrition Intervention


     Change Diet Order:                          Advance to Renal when


                                                 medically able


     Add Supplement/Snack (indicate name/kcal    Nepro Mixed Shook once daily


      /protein )                                 when diet advances


     Provides kCal:                              425


     Provides Protein (gm)                       19


     Goal #1                                     Meet atleast 80% kcal/PRO


                                                 needs via PO/ONS


     Goal #2                                     Wt maintenance/gain


     Anticipated Discharge Needs:                Renal Diet


     Follow-Up By:                               11/11/19


     Additional Comments                         FU diet advancement/PO/ONS

## 2019-11-09 NOTE — PROGRESS NOTE
Assessment and Plan





- Patient Problems


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


Current Visit: Yes   Status: Acute   


Plan to address problem: 


Presumed INDUCED interstitial nephritis.  Status post skin biopsy November 8.  

Follow-up kidney histology.


Patient on IV steroids.  Follow-up electrolytes and renal function.








(2) Hyponatremia


Current Visit: Yes   Status: Acute   


Plan to address problem: 


Hypovolemic hyponatremia.  Continue volume depletion.  Follow-up sodium and 

electrolytes








(3) Anemia in chronic illness


Current Visit: Yes   Status: Acute   


Plan to address problem: 


Follow-up hemoglobin.








(4) Metastatic lung cancer (metastasis from lung to other site)


Current Visit: Yes   Status: Acute   


Plan to address problem: 


Continue medications








Subjective


Date of service: 11/09/19


Principal diagnosis: acute kidney injury


Interval history: 





Patient seen lying in bed.  Complains of pain in his back.  No shortness of 

breath.  No nausea or vomiting.





Objective





- Exam


Narrative Exam: 





Middle-aged -American male lying in bed,  in no acute distress


HEENT: NCAT, pink oral mucous membrane


Neck: Supple, no venous distention


CVS: S1S2 RRR with no murmur, rub or gallop


Chest: Clear to auscultation


Abdomen: Protuberant, soft, nontender, no organomegaly, bowel sounds are present


Extremities: No edema


Neuro: Awake, alert no focal deficits





- Vital Signs


Vital signs: 


                               Vital Signs - 12hr











  11/09/19 11/09/19 11/09/19





  04:00 04:38 07:40


 


Temperature 98.0 F 98.0 F 


 


Pulse Rate 89 89 


 


Pulse Rate [   77





Anterior   





Bilateral   





Throughout]   


 


Respiratory 18 18 





Rate   


 


Respiratory   18





Rate [Anterior   





Bilateral   





Throughout]   


 


Blood Pressure  117/73 


 


Blood Pressure 117/73  





[Right]   


 


O2 Sat by Pulse 94 98 





Oximetry   














  11/09/19 11/09/19 11/09/19





  08:03 10:00 11:47


 


Temperature 97.8 F  97.8 F


 


Pulse Rate 101 H  89


 


Pulse Rate [   





Anterior   





Bilateral   





Throughout]   


 


Respiratory 18  18





Rate   


 


Respiratory   





Rate [Anterior   





Bilateral   





Throughout]   


 


Blood Pressure 125/81  127/82


 


Blood Pressure   





[Right]   


 


O2 Sat by Pulse 99 96 97





Oximetry   














  11/09/19 11/09/19





  12:07 14:50


 


Temperature  


 


Pulse Rate 76 


 


Pulse Rate [  77





Anterior  





Bilateral  





Throughout]  


 


Respiratory 17 





Rate  


 


Respiratory  18





Rate [Anterior  





Bilateral  





Throughout]  


 


Blood Pressure  


 


Blood Pressure  





[Right]  


 


O2 Sat by Pulse 96 





Oximetry  














- Lab





                                 11/09/19 05:51





                                 11/09/19 05:51


                             Most recent lab results











Calcium  8.8 mg/dL (8.4-10.2)   11/09/19  05:51    














Medications & Allergies





- Medications


Allergies/Adverse Reactions: 


                                    Allergies





No Known Allergies Allergy (Verified 11/07/19 12:18)


   








Home Medications: 


                                Home Medications











 Medication  Instructions  Recorded  Confirmed  Last Taken  Type


 


Crestor 20 mg PO DAILY 11/08/19 11/08/19 Unknown History


 


Plavix 75 mg PO DAILY 11/08/19 11/08/19 Unknown History


 


Synthroid 112 mcg PO DAILY 11/08/19 11/08/19 Unknown History


 


Toprol Xl 25 mg PO DAILY 11/08/19 11/08/19 Unknown History











Active Medications: 














Generic Name Dose Route Start Last Admin





  Trade Name Freq  PRN Reason Stop Dose Admin


 


Acetaminophen  650 mg  11/07/19 22:20 





  Tylenol  PO  





  Q4H PRN  





  Pain MILD(1-3)/Fever >100.5/HA  


 


Albuterol/Ipratropium  1 ampul  11/08/19 14:00  11/09/19 14:07





  Duoneb *Not For Prn Use*  IH   1 ampul





  TIDRT FERNANDA   Administration


 


Famotidine  20 mg  11/07/19 23:00  11/09/19 10:30





  Pepcid  PO   20 mg





  BID FERNANDA   Administration


 


Hydromorphone HCl  0.5 mg  11/07/19 22:20  11/09/19 10:36





  Dilaudid  IV   0.5 mg





  Q3H PRN   Administration





  Pain , Severe (7-10)  


 


Sodium Chloride  1,000 mls @ 100 mls/hr  11/07/19 19:00  11/08/19 23:20





  Nacl 0.9% 1000 Ml  IV   100 mls/hr





  AS DIRECT FERNANDA   Administration


 


Methylprednisolone Sodium Succinate  250 mg  11/08/19 10:00  11/09/19 10:30





  Solu-Medrol  IV  11/10/19 10:01  250 mg





  DAILY FERNANDA   Administration


 


Ondansetron HCl  4 mg  11/07/19 22:20  11/08/19 20:33





  Zofran  IV   4 mg





  Q8H PRN   Administration





  Nausea And Vomiting  


 


Oxycodone/Acetaminophen  1 tab  11/07/19 22:20  11/08/19 23:25





  Percocet 5/325  PO   1 tab





  Q6H PRN   Administration





  Pain, Moderate (4-6)  


 


Senna/Docusate Sodium  2 tab  11/08/19 21:07  11/08/19 23:21





  Senokot S  PO   2 tab





  Q12H PRN   Administration





  Laxative Effect  


 


Sodium Chloride  10 ml  11/07/19 23:00  11/09/19 10:33





  Sodium Chloride Flush Syringe 10 Ml  IV   10 ml





  BID FERNANDA   Administration


 


Sodium Chloride  10 ml  11/07/19 22:20 





  Sodium Chloride Flush Syringe 10 Ml  IV  





  PRN PRN  





  LINE FLUSH

## 2019-11-10 LAB
BUN SERPL-MCNC: 52 MG/DL (ref 9–20)
BUN/CREAT SERPL: 13 %
CALCIUM SERPL-MCNC: 9.1 MG/DL (ref 8.4–10.2)
CREATININE,URINE: 50.4 MG/DL (ref 0.1–20)
HEMOLYSIS INDEX: 0

## 2019-11-10 RX ADMIN — SODIUM CHLORIDE SCH MLS/HR: 0.9 INJECTION, SOLUTION INTRAVENOUS at 05:12

## 2019-11-10 RX ADMIN — SENNOSIDES AND DOCUSATE SODIUM PRN TAB: 50; 8.6 TABLET ORAL at 09:56

## 2019-11-10 RX ADMIN — IPRATROPIUM BROMIDE AND ALBUTEROL SULFATE SCH AMPUL: .5; 3 SOLUTION RESPIRATORY (INHALATION) at 07:59

## 2019-11-10 RX ADMIN — HYDROMORPHONE HYDROCHLORIDE PRN MG: 1 INJECTION, SOLUTION INTRAMUSCULAR; INTRAVENOUS; SUBCUTANEOUS at 05:16

## 2019-11-10 RX ADMIN — ONDANSETRON PRN MG: 2 INJECTION INTRAMUSCULAR; INTRAVENOUS at 05:15

## 2019-11-10 RX ADMIN — FAMOTIDINE SCH MG: 20 TABLET ORAL at 21:01

## 2019-11-10 RX ADMIN — IPRATROPIUM BROMIDE AND ALBUTEROL SULFATE SCH AMPUL: .5; 3 SOLUTION RESPIRATORY (INHALATION) at 21:53

## 2019-11-10 RX ADMIN — IPRATROPIUM BROMIDE AND ALBUTEROL SULFATE SCH AMPUL: .5; 3 SOLUTION RESPIRATORY (INHALATION) at 13:51

## 2019-11-10 RX ADMIN — FAMOTIDINE SCH MG: 20 TABLET ORAL at 09:56

## 2019-11-10 RX ADMIN — Medication SCH ML: at 21:01

## 2019-11-10 RX ADMIN — SENNOSIDES AND DOCUSATE SODIUM PRN TAB: 50; 8.6 TABLET ORAL at 20:52

## 2019-11-10 RX ADMIN — METHYLPREDNISOLONE SODIUM SUCCINATE SCH MG: 125 INJECTION, POWDER, FOR SOLUTION INTRAMUSCULAR; INTRAVENOUS at 09:56

## 2019-11-10 RX ADMIN — Medication SCH ML: at 09:59

## 2019-11-10 NOTE — XRAY REPORT
CHEST 1 VIEW 11/10/2019 4:06 PM



INDICATION / CLINICAL INFORMATION:

SOB.



COMPARISON: 

None available.



FINDINGS:



SUPPORT DEVICES: Left subclavian pacemaker leads have tips in right atrium and ventricle



HEART / MEDIASTINUM: No significant abnormality. 



LUNGS / PLEURA: Bullous emphysema both upper lobes with biapical parenchymal scarring. No pneumothora
x. 



ADDITIONAL FINDINGS: No significant additional findings.



IMPRESSION:

1. No acute findings. COPD.



Signer Name: Louie Willett MD 

Signed: 11/10/2019 4:22 PM

 Workstation Name: [id number]

## 2019-11-10 NOTE — PROGRESS NOTE
Assessment and Plan


Assessment and plan: 


--Hyponatremia; improving gradually


 gentle hydration with sodium chloride, monitor electrolytes





--Acute kidney injury; probably secondary to vasomotor nephropathy


Gentle hydration, monitor renal function, avoid nephrotoxins


Nephrology following, recommended biopsy





--Anemia; probably anemia due to ?  Chronic kidney disease


Closely monitor H&H and transfuse as needed





--History of lung cancer; follows with hematology oncologist





-History of COPD; oxygen titrate to O2 sats more than 90%


Nebulizers IV steroids if needed





--Moderate malnutrition/hypoalbuminemia


Supportive care nutrition consult if needed





--DVT prophylaxis; heparin renal dose





Monitor closely and adjust management as needed


D/W nephrologist , monitor renal function 1 more day


And decide the Disposition








History


Interval history: 


Patient seen and examined this morning in his room medical records reviewed


Patient complains of mild shortness of breath


Receiving IV fluids for his acute kidney injury


Denies chest pain,No nausea or vomiting


Alert awake oriented 3


Vital signs reviewed








Hospitalist Physical





- Constitutional


Vitals: 


                                        











Temp Pulse Resp BP Pulse Ox


 


 98.8 F   101 H  18   109/60   96 


 


 11/10/19 05:03  11/10/19 05:01  11/10/19 05:46  11/10/19 05:01  11/10/19 05:01











General appearance: Present: no acute distress, well-nourished





- EENT


Eyes: Present: PERRL, EOM intact





- Neck


Neck: Present: supple, normal ROM





- Respiratory


Respiratory effort: normal


Respiratory: bilateral: diminished, rales, negative: rhonchi, wheezing





- Cardiovascular


Rhythm: regular


Heart Sounds: Present: S1 & S2





- Extremities


Extremities: no ischemia, No edema





- Abdominal


General gastrointestinal: soft, non-tender, non-distended, normal bowel sounds





- Integumentary


Integumentary: Present: clear, warm





- Psychiatric


Psychiatric: appropriate mood/affect, cooperative





- Neurologic


Neurologic: CNII-XII intact, moves all extremities





Results





- Labs


CBC & Chem 7: 


                                 11/09/19 05:51





                                 11/10/19 12:24


Labs: 


                             Laboratory Last Values











WBC  9.8 K/mm3 (4.5-11.0)   11/09/19  05:51    


 


RBC  2.30 M/mm3 (3.65-5.03)  L  11/09/19  05:51    


 


Hgb  7.4 gm/dl (11.8-15.2)  L  11/09/19  05:51    


 


Hct  22.2 % (35.5-45.6)  L  11/09/19  05:51    


 


MCV  96 fl (84-94)  H  11/09/19  05:51    


 


MCH  32 pg (28-32)   11/09/19  05:51    


 


MCHC  34 % (32-34)   11/09/19  05:51    


 


RDW  24.0 % (13.2-15.2)  H  11/09/19  05:51    


 


Plt Count  222 K/mm3 (140-440)   11/09/19  05:51    


 


Add Manual Diff  Complete   11/08/19  05:41    


 


Total Counted  100   11/08/19  05:41    


 


Seg Neuts % (Manual)  79.0 % (40.0-70.0)  H  11/08/19  05:41    


 


Band Neutrophils %  0 %  11/08/19  05:41    


 


Lymphocytes % (Manual)  12.0 % (13.4-35.0)  L  11/08/19  05:41    


 


Reactive Lymphs % (Man)  0 %  11/08/19  05:41    


 


Monocytes % (Manual)  8.0 % (0.0-7.3)  H  11/08/19  05:41    


 


Eosinophils % (Manual)  1.0 % (0.0-4.3)   11/08/19  05:41    


 


Basophils % (Manual)  0 % (0.0-1.8)   11/08/19  05:41    


 


Metamyelocytes %  0 %  11/08/19  05:41    


 


Myelocytes %  0 %  11/08/19  05:41    


 


Promyelocytes %  0 %  11/08/19  05:41    


 


Blast Cells %  0 %  11/08/19  05:41    


 


Nucleated RBC %  Not Reportable   11/08/19  05:41    


 


Seg Neutrophils # Man  5.2 K/mm3 (1.8-7.7)   11/08/19  05:41    


 


Band Neutrophils #  0.0 K/mm3  11/08/19  05:41    


 


Lymphocytes # (Manual)  0.8 K/mm3 (1.2-5.4)  L  11/08/19  05:41    


 


Abs React Lymphs (Man)  0.0 K/mm3  11/08/19  05:41    


 


Monocytes # (Manual)  0.5 K/mm3 (0.0-0.8)   11/08/19  05:41    


 


Eosinophils # (Manual)  0.1 K/mm3 (0.0-0.4)   11/08/19  05:41    


 


Basophils # (Manual)  0.0 K/mm3 (0.0-0.1)   11/08/19  05:41    


 


Metamyelocytes #  0.0 K/mm3  11/08/19  05:41    


 


Myelocytes #  0.0 K/mm3  11/08/19  05:41    


 


Promyelocytes #  0.0 K/mm3  11/08/19  05:41    


 


Blast Cells #  0.0 K/mm3  11/08/19  05:41    


 


WBC Morphology  Not Reportable   11/08/19  05:41    


 


Hypersegmented Neuts  Not Reportable   11/08/19  05:41    


 


Hyposegmented Neuts  Not Reportable   11/08/19  05:41    


 


Hypogranular Neuts  Not Reportable   11/08/19  05:41    


 


Smudge Cells  Not Reportable   11/08/19  05:41    


 


Toxic Granulation  Not Reportable   11/08/19  05:41    


 


Toxic Vacuolation  Not Reportable   11/08/19  05:41    


 


Dohle Bodies  Not Reportable   11/08/19  05:41    


 


Pelger-Huet Anomaly  Not Reportable   11/08/19  05:41    


 


Zack Rods  Not Reportable   11/08/19  05:41    


 


Platelet Estimate  Consistent w auto   11/08/19  05:41    


 


Clumped Platelets  Not Reportable   11/08/19  05:41    


 


Plt Clumps, EDTA  Not Reportable   11/08/19  05:41    


 


Large Platelets  Not Reportable   11/08/19  05:41    


 


Giant Platelets  Not Reportable   11/08/19  05:41    


 


Platelet Satelliting  Not Reportable   11/08/19  05:41    


 


Plt Morphology Comment  Not Reportable   11/08/19  05:41    


 


RBC Morphology  Not Reportable   11/08/19  05:41    


 


Dimorphic RBCs  Not Reportable   11/08/19  05:41    


 


Polychromasia  Not Reportable   11/08/19  05:41    


 


Hypochromasia  Not Reportable   11/08/19  05:41    


 


Poikilocytosis  Few   11/08/19  05:41    


 


Anisocytosis  1+   11/08/19  05:41    


 


Microcytosis  Few   11/08/19  05:41    


 


Macrocytosis  Not Reportable   11/08/19  05:41    


 


Spherocytes  Not Reportable   11/08/19  05:41    


 


Pappenheimer Bodies  Not Reportable   11/08/19  05:41    


 


Sickle Cells  Not Reportable   11/08/19  05:41    


 


Target Cells  Not Reportable   11/08/19  05:41    


 


Tear Drop Cells  Rare   11/08/19  05:41    


 


Ovalocytes  Few   11/08/19  05:41    


 


Helmet Cells  Not Reportable   11/08/19  05:41    


 


Wilson-[place] Bodies  Not Reportable   11/08/19  05:41    


 


Cabot Rings  Not Reportable   11/08/19  05:41    


 


Karuna Cells  Not Reportable   11/08/19  05:41    


 


Bite Cells  Not Reportable   11/08/19  05:41    


 


Crenated Cell  Not Reportable   11/08/19  05:41    


 


Elliptocytes  Few   11/08/19  05:41    


 


Acanthocytes (Spur)  Not Reportable   11/08/19  05:41    


 


Rouleaux  Not Reportable   11/08/19  05:41    


 


Hemoglobin C Crystals  Not Reportable   11/08/19  05:41    


 


Schistocytes  Not Reportable   11/08/19  05:41    


 


Malaria parasites  Not Reportable   11/08/19  05:41    


 


Sharad Bodies  Not Reportable   11/08/19  05:41    


 


Hem Pathologist Commnt  No   11/08/19  05:41    


 


PT  15.2 Sec. (12.2-14.9)  H  11/08/19  05:41    


 


INR  1.21  (0.87-1.13)  H  11/08/19  05:41    


 


APTT  33.2 Sec. (24.2-36.6)   11/08/19  05:41    


 


Sodium  130 mmol/L (137-145)  L  11/09/19  05:51    


 


Potassium  4.6 mmol/L (3.6-5.0)   11/09/19  05:51    


 


Chloride  96.8 mmol/L ()  L  11/09/19  05:51    


 


Carbon Dioxide  17 mmol/L (22-30)  L  11/09/19  05:51    


 


Anion Gap  21 mmol/L  11/09/19  05:51    


 


BUN  53 mg/dL (9-20)  H  11/09/19  05:51    


 


Creatinine  4.6 mg/dL (0.8-1.5)  H  11/09/19  05:51    


 


Estimated GFR  16 ml/min  11/09/19  05:51    


 


BUN/Creatinine Ratio  12 %  11/09/19  05:51    


 


Glucose  127 mg/dL ()  H  11/09/19  05:51    


 


POC Glucose  110  ()  H  11/08/19  12:08    


 


Calcium  8.8 mg/dL (8.4-10.2)   11/09/19  05:51    


 


Total Bilirubin  0.40 mg/dL (0.1-1.2)   11/08/19  05:41    


 


AST  19 units/L (5-40)   11/08/19  05:41    


 


ALT  < 5 units/L (7-56)  L  11/08/19  05:41    


 


Alkaline Phosphatase  68 units/L ()   11/08/19  05:41    


 


Total Protein  6.7 g/dL (6.3-8.2)   11/08/19  05:41    


 


Albumin  3.0 g/dL (3.9-5)  L  11/08/19  05:41    


 


Albumin/Globulin Ratio  0.8 %  11/08/19  05:41    


 


Urine Color  Straw  (Yellow)   11/07/19  Unknown


 


Urine Turbidity  Clear  (Clear)   11/07/19  Unknown


 


Urine pH  6.0  (5.0-7.0)   11/07/19  Unknown


 


Ur Specific Gravity  1.006  (1.003-1.030)   11/07/19  Unknown


 


Urine Protein  <15 mg/dl mg/dL (Negative)   11/07/19  Unknown


 


Urine Glucose (UA)  Neg mg/dL (Negative)   11/07/19  Unknown


 


Urine Ketones  Neg mg/dL (Negative)   11/07/19  Unknown


 


Urine Blood  Neg  (Negative)   11/07/19  Unknown


 


Urine Nitrite  Neg  (Negative)   11/07/19  Unknown


 


Urine Bilirubin  Neg  (Negative)   11/07/19  Unknown


 


Urine Urobilinogen  < 2.0 mg/dL (<2.0)   11/07/19  Unknown


 


Ur Leukocyte Esterase  Neg  (Negative)   11/07/19  Unknown


 


Urine WBC (Auto)  < 1.0 /HPF (0.0-6.0)   11/07/19  Unknown


 


Urine RBC (Auto)  2.0 /HPF (0.0-6.0)   11/07/19  Unknown


 


Urine Eosinophils  None seen  (None Seen)   11/07/19  Unknown


 


Urine Creatinine  50.4 mg/dL (0.1-20.0)  H  11/10/19  03:30    


 


Urine Sodium  25 mmol/L  11/10/19  03:30    


 


Urine Total Protein  10 mg/dL (5-11.8)   11/10/19  03:30    














Active Medications





- Current Medications


Current Medications: 














Generic Name Dose Route Start Last Admin





  Trade Name Sangeetha  PRN Reason Stop Dose Admin


 


Acetaminophen  650 mg  11/07/19 22:20 





  Tylenol  PO  





  Q4H PRN  





  Pain MILD(1-3)/Fever >100.5/HA  


 


Albuterol/Ipratropium  1 ampul  11/08/19 14:00  11/10/19 07:59





  Duoneb *Not For Prn Use*  IH   1 ampul





  TIDRT FERNANDA   Administration


 


Famotidine  20 mg  11/07/19 23:00  11/09/19 21:37





  Pepcid  PO   20 mg





  BID FERNANDA   Administration


 


Hydromorphone HCl  0.5 mg  11/07/19 22:20  11/10/19 05:16





  Dilaudid  IV   0.5 mg





  Q3H PRN   Administration





  Pain , Severe (7-10)  


 


Sodium Chloride  1,000 mls @ 100 mls/hr  11/07/19 19:00  11/10/19 05:12





  Nacl 0.9% 1000 Ml  IV   100 mls/hr





  AS DIRECT FERNANDA   Administration


 


Methylprednisolone Sodium Succinate  250 mg  11/08/19 10:00  11/09/19 10:30





  Solu-Medrol  IV  11/10/19 10:01  250 mg





  DAILY FERNANDA   Administration


 


Ondansetron HCl  4 mg  11/07/19 22:20  11/10/19 05:15





  Zofran  IV   4 mg





  Q8H PRN   Administration





  Nausea And Vomiting  


 


Oxycodone/Acetaminophen  1 tab  11/07/19 22:20  11/08/19 23:25





  Percocet 5/325  PO   1 tab





  Q6H PRN   Administration





  Pain, Moderate (4-6)  


 


Senna/Docusate Sodium  2 tab  11/08/19 21:07  11/09/19 20:35





  Senokot S  PO   2 tab





  Q12H PRN   Administration





  Laxative Effect  


 


Sodium Chloride  10 ml  11/07/19 23:00  11/09/19 21:38





  Sodium Chloride Flush Syringe 10 Ml  IV   10 ml





  BID FERNANDA   Administration


 


Sodium Chloride  10 ml  11/07/19 22:20 





  Sodium Chloride Flush Syringe 10 Ml  IV  





  PRN PRN  





  LINE FLUSH  














Nutrition/Malnutrition Assess





- Dietary Evaluation


Nutrition/Malnutrition Findings: 


                                        





Nutrition Notes                                            Start:  11/08/19 

11:39


Freq:                                                      Status: Active       




Protocol:                                                                       




 Document     11/08/19 11:39  DW  (Rec: 11/08/19 12:07  DW  [id number])


 Co-Sign      11/08/19 11:39  LP


 Nutrition Notes


     Need for Assessment generated from:         MD Order,RN Referral,MST


     Initial or Follow up                        Assessment


     Current Diagnosis                           CKD(stage I-IV),COPD


     Other Pertinent Diagnosis                   Lung Cancer on chemo


     Current Diet                                NPO


     Labs/Tests                                  Na 131


                                                 BUN 52


                                                 Cr 4.2


     Pertinent Medications                       Solu Medrol


     Height                                      5 ft 9 in


     Weight                                      75 kg


     Ideal Body Weight (kg)                      72.72


     BMI                                         24.4


     Weight change and time frame                19% wt loss in 2 weeks


     Subjective/Other Information                MD consult for poor oral


                                                 intake, ONS and MST screen


                                                 Upon arrival pt stated his


                                                 appetite was not good PTA and


                                                 did not feel like eating. Pt


                                                 stated his taste has changed


                                                 and enhances food with spices.


                                                 Pt stated he is feeling


                                                 better now with medication and


                                                 fluids given at hospital and


                                                 feels hungry now. Pt stated


                                                 his UBW is 164 two weeks ago.


                                                 Noted slight temporal wasting.


     Burn                                        Absent


     Trauma                                      Absent


     GI Symptoms                                 None


     Minimum of two criteria                     Yes


     Energy Intake (severe)                      < or equal to 50% Estimated


                                                 Energy Requirement > or equal


                                                 to 5 days


     Interpretation of Weight Loss (severe)      >2% in 1 week


     Muscle Mass                                 Mild Depletion (non-severe)


     #1


      Nutrition Diagnosis                        Malnutrition


      Etiology                                   chemo therapy and taste


                                                 changes secondary to lung


                                                 cancer


      As Evidenced by Signs and Symptoms         19% wt loss in 2 weeks, poor


                                                 appetite and intake >5 days,


                                                 mild temporal wasting


     Is patient on ventilator?                   No


     Is Patient Ambulatory and/or Out of Bed     Yes


     REE-([facility]-ambulatory/OOB) [     2002.494


      [id number]]                               


     Kcal/Kg value to use for calculation        34


     Approximate Energy Requirements Using       2550


      kcal/Kg                                    


     Calculation Used for Recommendations        Kcal/kg


     Additional Notes                            PRO needs: 60-90g (0.8-1.2g/kg


                                                 ) considering CKD,


                                                 malnutrition, cancer


                                                 Fluid needs: Per MD


 Nutrition Intervention


     Change Diet Order:                          Advance to Renal when


                                                 medically able


     Add Supplement/Snack (indicate name/kcal    Nepro Mixed Shook once daily


      /protein )                                 when diet advances


     Provides kCal:                              425


     Provides Protein (gm)                       19


     Goal #1                                     Meet atleast 80% kcal/PRO


                                                 needs via PO/ONS


     Goal #2                                     Wt maintenance/gain


     Anticipated Discharge Needs:                Renal Diet


     Follow-Up By:                               11/11/19


     Additional Comments                         FU diet advancement/PO/ONS

## 2019-11-10 NOTE — PROGRESS NOTE
Assessment and Plan





- Patient Problems


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


Current Visit: Yes   Status: Acute   


Plan to address problem: 


Presumed INDUCED interstitial nephritis.  Status post skin biopsy November 8.  

Follow-up kidney histology.


Patient on IV steroids completing the course today.  We'll start po prednisone. 

Follow-up electrolytes and renal function..  Follow-up acute kidney injury.  

We'll dialyze if any signs of renal recovery








(2) Hyponatremia


Current Visit: Yes   Status: Acute   


Plan to address problem: 


Hypovolemic hyponatremia.  Continue volume depletion.  Follow-up sodium and 

electrolytes








(3) Anemia in chronic illness


Current Visit: Yes   Status: Acute   


Plan to address problem: 


Follow-up hemoglobin.








(4) Metastatic lung cancer (metastasis from lung to other site)


Current Visit: Yes   Status: Acute   


Plan to address problem: 


Continue medications








Subjective


Principal diagnosis: acute kidney injury


Interval history: 





Patient seen lying in bed.  Complains of pain in his back.  Had some shortness 

of breath.  No nausea or vomiting.





Objective





- Exam


Narrative Exam: 





Middle-aged -American male lying in bed,  in no acute distress


HEENT: NCAT, pink oral mucous membrane


Neck: Supple, no venous distention


CVS: S1S2 RRR with no murmur, rub or gallop


Chest: Clear to auscultation


Abdomen: Protuberant, soft, nontender, no organomegaly, bowel sounds are present


Extremities: No edema


Neuro: Awake, alert no focal deficits





- Vital Signs


Vital signs: 


                               Vital Signs - 12hr











  11/10/19 11/10/19 11/10/19





  05:01 05:03 05:16


 


Temperature  98.8 F 


 


Pulse Rate 101 H  


 


Pulse Rate [   





Posterior   





Bilateral   





Throughout]   


 


Respiratory 18  18





Rate   


 


Respiratory   





Rate [Posterior   





Bilateral   





Throughout]   


 


Blood Pressure 109/60  


 


O2 Sat by Pulse 96  





Oximetry   














  11/10/19 11/10/19 11/10/19





  05:46 08:00 10:00


 


Temperature   


 


Pulse Rate   97 H


 


Pulse Rate [  91 H 





Posterior   





Bilateral   





Throughout]   


 


Respiratory 18  18





Rate   


 


Respiratory  18 





Rate [Posterior   





Bilateral   





Throughout]   


 


Blood Pressure   


 


O2 Sat by Pulse   97





Oximetry   














  11/10/19





  14:03


 


Temperature 


 


Pulse Rate 


 


Pulse Rate [ 96 H





Posterior 





Bilateral 





Throughout] 


 


Respiratory 





Rate 


 


Respiratory 18





Rate [Posterior 





Bilateral 





Throughout] 


 


Blood Pressure 


 


O2 Sat by Pulse 





Oximetry 














- Lab





                                 11/09/19 05:51





                                 11/10/19 12:24


                             Most recent lab results











Calcium  9.1 mg/dL (8.4-10.2)   11/10/19  12:24    


 


Urine Creatinine  50.4 mg/dL (0.1-20.0)  H  11/10/19  03:30    


 


Urine Sodium  25 mmol/L  11/10/19  03:30    


 


Urine Total Protein  10 mg/dL (5-11.8)   11/10/19  03:30    














Medications & Allergies





- Medications


Allergies/Adverse Reactions: 


                                    Allergies





No Known Allergies Allergy (Verified 11/07/19 12:18)


   








Home Medications: 


                                Home Medications











 Medication  Instructions  Recorded  Confirmed  Last Taken  Type


 


Crestor 20 mg PO DAILY 11/08/19 11/08/19 Unknown History


 


Plavix 75 mg PO DAILY 11/08/19 11/08/19 Unknown History


 


Synthroid 112 mcg PO DAILY 11/08/19 11/08/19 Unknown History


 


Toprol Xl 25 mg PO DAILY 11/08/19 11/08/19 Unknown History











Active Medications: 














Generic Name Dose Route Start Last Admin





  Trade Name Freq  PRN Reason Stop Dose Admin


 


Acetaminophen  650 mg  11/07/19 22:20 





  Tylenol  PO  





  Q4H PRN  





  Pain MILD(1-3)/Fever >100.5/HA  


 


Albuterol/Ipratropium  1 ampul  11/08/19 14:00  11/10/19 13:51





  Duoneb *Not For Prn Use*  IH   1 ampul





  TIDRT FERNANDA   Administration


 


Bisacodyl  10 mg  11/10/19 11:47 





  Dulcolax  MD  





  QDAY PRN  





  Constipation  


 


Famotidine  20 mg  11/07/19 23:00  11/10/19 09:56





  Pepcid  PO   20 mg





  BID FERNANDA   Administration


 


Hydromorphone HCl  0.5 mg  11/07/19 22:20  11/10/19 05:16





  Dilaudid  IV   0.5 mg





  Q3H PRN   Administration





  Pain , Severe (7-10)  


 


Sodium Chloride  1,000 mls @ 100 mls/hr  11/07/19 19:00  11/10/19 05:12





  Nacl 0.9% 1000 Ml  IV   100 mls/hr





  AS DIRECT FERNANDA   Administration


 


Magnesium Hydroxide  30 ml  11/10/19 11:46 





  Milk Of Magnesia  PO  





  QDAY PRN  





  Constipation  


 


Ondansetron HCl  4 mg  11/07/19 22:20  11/10/19 05:15





  Zofran  IV   4 mg





  Q8H PRN   Administration





  Nausea And Vomiting  


 


Oxycodone/Acetaminophen  1 tab  11/07/19 22:20  11/08/19 23:25





  Percocet 5/325  PO   1 tab





  Q6H PRN   Administration





  Pain, Moderate (4-6)  


 


Prednisone  60 mg  11/11/19 10:00 





  Deltasone  PO  





  QDAY FERNANDA  


 


Senna/Docusate Sodium  2 tab  11/08/19 21:07  11/10/19 09:56





  Senokot S  PO   2 tab





  Q12H PRN   Administration





  Laxative Effect  


 


Sodium Chloride  10 ml  11/07/19 23:00  11/10/19 09:59





  Sodium Chloride Flush Syringe 10 Ml  IV   10 ml





  BID FERNANDA   Administration


 


Sodium Chloride  10 ml  11/07/19 22:20 





  Sodium Chloride Flush Syringe 10 Ml  IV  





  PRN PRN  





  LINE FLUSH

## 2019-11-11 VITALS — DIASTOLIC BLOOD PRESSURE: 69 MMHG | SYSTOLIC BLOOD PRESSURE: 104 MMHG

## 2019-11-11 LAB
BUN SERPL-MCNC: 57 MG/DL (ref 9–20)
BUN/CREAT SERPL: 14 %
CALCIUM SERPL-MCNC: 9.3 MG/DL (ref 8.4–10.2)
HEMOLYSIS INDEX: 1

## 2019-11-11 RX ADMIN — IPRATROPIUM BROMIDE AND ALBUTEROL SULFATE SCH AMPUL: .5; 3 SOLUTION RESPIRATORY (INHALATION) at 07:38

## 2019-11-11 RX ADMIN — Medication SCH ML: at 09:37

## 2019-11-11 RX ADMIN — IPRATROPIUM BROMIDE AND ALBUTEROL SULFATE SCH: .5; 3 SOLUTION RESPIRATORY (INHALATION) at 14:09

## 2019-11-11 RX ADMIN — FAMOTIDINE SCH MG: 20 TABLET ORAL at 09:37

## 2019-11-11 NOTE — PROGRESS NOTE
Assessment and Plan





- Patient Problems


(1) Acute interstitial nephritis


Current Visit: Yes   Status: Acute   


Plan to address problem: 


s/p renal biopsy.


Started on oral prednisone therapy.


Overall renal function is stable, and no acute indications for renal replacement

therapy.


From renal standpoint, he is stable for DC, with plan to f/u as outpatient with 

Dr. Reis in one week post discharge. 








(2) Hyponatremia


Current Visit: Yes   Status: Acute   


Plan to address problem: 


Improved with IVF hydration. 








(3) Anemia in chronic illness


Current Visit: Yes   Status: Acute   


Plan to address problem: 


Follow up hemoglobin levels. 








(4) Metastatic lung cancer (metastasis from lung to other site)


Current Visit: Yes   Status: Acute   


Plan to address problem: 


Management per oncology. 








Subjective


Date of service: 11/11/19


Principal diagnosis: acute kidney injury


Interval history: 





No acute issues overnight. chest xray is stable. Labs remain stable, s/p renal 

biopsy. 





Objective





- Vital Signs


Vital signs: 


                               Vital Signs - 12hr











  11/11/19 11/11/19 11/11/19





  03:53 03:55 07:49


 


Temperature  98.8 F 


 


Pulse Rate 95 H  


 


Pulse Rate [   





Posterior   





Bilateral   





Throughout]   


 


Respiratory 18  20





Rate   


 


Respiratory   





Rate [Posterior   





Bilateral   





Throughout]   


 


Blood Pressure 103/65  


 


O2 Sat by Pulse 96  98





Oximetry   














  11/11/19 11/11/19 11/11/19





  07:50 08:01 10:00


 


Temperature  98.4 F 


 


Pulse Rate  91 H 


 


Pulse Rate [ 91 H  





Posterior   





Bilateral   





Throughout]   


 


Respiratory  16 





Rate   


 


Respiratory 18  





Rate [Posterior   





Bilateral   





Throughout]   


 


Blood Pressure  103/68 


 


O2 Sat by Pulse  99 99





Oximetry   














  11/11/19 11/11/19





  10:47 11:54


 


Temperature  98.5 F


 


Pulse Rate 87 87


 


Pulse Rate [  





Posterior  





Bilateral  





Throughout]  


 


Respiratory  18





Rate  


 


Respiratory  





Rate [Posterior  





Bilateral  





Throughout]  


 


Blood Pressure  104/69


 


O2 Sat by Pulse  98





Oximetry  














- General Appearance


General appearance: well-developed, well-nourished, appears stated age


EENT: ATNC, PERRL


Neck: no JVD, no thyromegaly


Respiratory: Present: Clear to Ascultation, Normal Exam


Cardiology: regular, S1S2


Gastrointestinal: normal, normoactive bowel sounds


Integumentary: no rash, warm and dry


Neurologic: no focal deficit, no asterixis


Psychiatric: mood/affect appropriate, cooperative





- Lab





                                 11/09/19 05:51





                                 11/11/19 05:39


                             Most recent lab results











Calcium  9.3 mg/dL (8.4-10.2)   11/11/19  05:39    


 


Urine Creatinine  50.4 mg/dL (0.1-20.0)  H  11/10/19  03:30    


 


Urine Sodium  25 mmol/L  11/10/19  03:30    


 


Urine Total Protein  10 mg/dL (5-11.8)   11/10/19  03:30    














- Allied health notes


Allied health notes reviewed: nursing





Medications & Allergies





- Medications


Allergies/Adverse Reactions: 


                                    Allergies





No Known Allergies Allergy (Verified 11/07/19 12:18)


   








Home Medications: 


                                Home Medications











 Medication  Instructions  Recorded  Confirmed  Last Taken  Type


 


Crestor 20 mg PO DAILY 11/08/19 11/08/19 Unknown History


 


Plavix 75 mg PO DAILY 11/08/19 11/08/19 Unknown History


 


Synthroid 112 mcg PO DAILY 11/08/19 11/08/19 Unknown History


 


Toprol Xl 25 mg PO DAILY 11/08/19 11/08/19 Unknown History











Active Medications: 














Generic Name Dose Route Start Last Admin





  Trade Name Sangeetha  PRN Reason Stop Dose Admin


 


Acetaminophen  650 mg  11/07/19 22:20 





  Tylenol  PO  





  Q4H PRN  





  Pain MILD(1-3)/Fever >100.5/HA  


 


Albuterol/Ipratropium  1 ampul  11/08/19 14:00  11/11/19 07:38





  Duoneb *Not For Prn Use*  IH   1 ampul





  TIDRT FERNANDA   Administration


 


Bisacodyl  10 mg  11/10/19 11:47 





  Dulcolax  AK  





  QDAY PRN  





  Constipation  


 


Famotidine  20 mg  11/07/19 23:00  11/11/19 09:37





  Pepcid  PO   20 mg





  BID FERNANDA   Administration


 


Hydromorphone HCl  0.5 mg  11/07/19 22:20  11/10/19 05:16





  Dilaudid  IV   0.5 mg





  Q3H PRN   Administration





  Pain , Severe (7-10)  


 


Magnesium Hydroxide  30 ml  11/10/19 11:46  11/10/19 20:52





  Milk Of Magnesia  PO   30 ml





  QDAY PRN   Administration





  Constipation  


 


Ondansetron HCl  4 mg  11/07/19 22:20  11/10/19 05:15





  Zofran  IV   4 mg





  Q8H PRN   Administration





  Nausea And Vomiting  


 


Oxycodone/Acetaminophen  1 tab  11/07/19 22:20  11/08/19 23:25





  Percocet 5/325  PO   1 tab





  Q6H PRN   Administration





  Pain, Moderate (4-6)  


 


Prednisone  60 mg  11/11/19 10:00  11/11/19 09:37





  Deltasone  PO   60 mg





  QDAY FERNANDA   Administration


 


Sodium Chloride  10 ml  11/07/19 23:00  11/11/19 09:37





  Sodium Chloride Flush Syringe 10 Ml  IV   10 ml





  BID FERNANDA   Administration


 


Sodium Chloride  10 ml  11/07/19 22:20 





  Sodium Chloride Flush Syringe 10 Ml  IV  





  PRN PRN  





  LINE FLUSH

## 2019-11-11 NOTE — DISCHARGE SUMMARY
Providers





- Providers


Date of Admission: 


11/07/19 15:41





Date of discharge: 11/11/19


Attending physician: 


AMY J KOCHERLA





                                        





11/07/19 12:31


Consult to Physician [CONS] Routine 


   Comment: 


   Consulting Provider: VLAD MURILLO


   Physician Instructions: 


   Reason For Exam: ACUTE RENAL FAILURE, WORSENING RENAL FUNCTION,





11/07/19 16:41


Consult to Dietitian/Nutrition [CONS] Routine 


   Physician Instructions: 


   Reason For Exam: 


   Reason for Consult: Poor oral intake











Primary care physician: 


AARON ARREDONDO








Hospitalization


Reason for admission: worsening renal function


Condition: Stable


Pertinent studies: 


Chest x-ray





Procedures: 


Renal biopsy


Hospital course: 


64-year-old male patient with significant past medical history of lung cancer 

elevated creatinine was admitted in [facility] from 10/14/2019 2 

10/25/2019 for acute kidney injury and worsening pulmonary infiltrate from his 

lung cancer on chemotherapy was admitted through emergency room with worsening 

renal function patient's creatinine in May 2019 was 1.5 however upon 

presentation his creatinine was 4.7, Symptomatically managed evaluated by 

nephrology underwent renal biopsy report is still pending, Managed with IV 

hydration and supportive care Patient is renal function with the minimally 

improved with a creatinine at admission 4.7-4.1 today, Patient advised to avoid 

nephrotoxins.


Today patient is comfortable no new complaints vital signs stable physical 

examination unremarkable


Nephrology with for discharge with prednisone 60 daily for 7-10 days


And follow-up with nephrologist Yesika then start tapering the medication 

spending clinical evaluation


Patient is hemodynamically and clinically stable at discharge








Discharge diagnosis;


--Hyponatremia; improving gradually


gentle hydration with sodium chloride, monitor electrolytes





--Acute kidney injury; probably secondary to vasomotor nephropathy


Gentle hydration, monitor renal function, avoid nephrotoxins


Nephrology following, recommended biopsy





--Anemia; probably anemia due to ?  Chronic kidney disease


Closely monitor H&H and transfuse as needed





--History of lung cancer; follows with hematology oncologist





-History of COPD; oxygen titrate to O2 sats more than 90%


Nebulizers IV steroids if needed





--Moderate malnutrition/hypoalbuminemia


Supportive care nutrition consult if needed





--DVT prophylaxis; heparin renal dose





Monitor closely and adjust management as needed


D/W nephrologist , cleared for discharge 








Disposition: [id number] HOME UNDER HOME [facility]


Time spent for discharge: 32 min





Core Measure Documentation





- Palliative Care


Palliative Care/ Comfort Measures: Not Applicable





- Core Measures


Any of the following diagnoses?: none





Exam





- Constitutional


Vitals: 


                                        











Temp Pulse Resp BP Pulse Ox


 


 98.5 F   87   18   104/69   98 


 


 11/11/19 11:54  11/11/19 11:54  11/11/19 11:54  11/11/19 11:54  11/11/19 11:54











General appearance: Present: no acute distress, well-nourished





- EENT


Eyes: Present: PERRL, EOM intact





- Neck


Neck: Present: supple, normal ROM





- Respiratory


Respiratory effort: normal


Respiratory: bilateral: diminished, negative: rales, rhonchi, wheezing





- Cardiovascular


Rhythm: regular


Heart Sounds: Present: S1 & S2





- Extremities


Extremities: no ischemia, No edema





- Abdominal


General gastrointestinal: Present: soft, non-tender, non-distended, normal bowel

 sounds





- Integumentary


Integumentary: Present: clear, warm





- Musculoskeletal


Musculoskeletal: strength equal bilaterally





- Psychiatric


Psychiatric: appropriate mood/affect, cooperative





- Neurologic


Neurologic: CNII-XII intact, moves all extremities





Plan


Activity: advance as tolerated, fall precautions


Diet: renal


Additional Instructions: Advised to follow up with private oncologist per 

schedule


Follow up with: 


AARON ARREDONDO MD [Primary Care Provider] - 7 Days


VLAD MURILLO MD [Staff Physician] - 7 Days


Prescriptions: 


predniSONE [Deltasone] 3 tab PO DAILY #30 tablet


ALBUTEROL Inhaler (OR & NICU) [ProAir HFA Inhaler] 2 puff IH QID PRN #1


 PRN Reason: Shortness Of Breath